# Patient Record
Sex: MALE | Race: WHITE | NOT HISPANIC OR LATINO | Employment: FULL TIME | URBAN - METROPOLITAN AREA
[De-identification: names, ages, dates, MRNs, and addresses within clinical notes are randomized per-mention and may not be internally consistent; named-entity substitution may affect disease eponyms.]

---

## 2018-09-27 ENCOUNTER — HOSPITAL ENCOUNTER (OUTPATIENT)
Dept: GENERAL RADIOLOGY | Facility: CLINIC | Age: 68
End: 2018-09-27
Attending: PODIATRIST
Payer: COMMERCIAL

## 2018-09-27 ENCOUNTER — HOSPITAL ENCOUNTER (OUTPATIENT)
Dept: MRI IMAGING | Facility: CLINIC | Age: 68
Discharge: HOME | End: 2018-09-27
Attending: PODIATRIST | Admitting: PODIATRIST
Payer: COMMERCIAL

## 2018-09-27 DIAGNOSIS — Z87.821 HISTORY OF FOREIGN BODY IN EYE: ICD-10-CM

## 2018-09-27 DIAGNOSIS — M25.579 ANKLE PAIN, UNSPECIFIED CHRONICITY, UNSPECIFIED LATERALITY: ICD-10-CM

## 2018-09-27 PROCEDURE — 73721 MRI JNT OF LWR EXTRE W/O DYE: CPT | Mod: RT

## 2018-09-27 PROCEDURE — 70030 X-RAY EYE FOR FOREIGN BODY: CPT

## 2018-11-28 ASSESSMENT — MIFFLIN-ST. JEOR: SCORE: 2062.77

## 2018-11-30 ASSESSMENT — MIFFLIN-ST. JEOR: SCORE: 2026.48

## 2018-12-05 ENCOUNTER — TRANSFERRED RECORDS (OUTPATIENT)
Dept: HEALTH INFORMATION MANAGEMENT | Facility: CLINIC | Age: 68
End: 2018-12-05

## 2018-12-05 ENCOUNTER — ANESTHESIA - HEALTHEAST (OUTPATIENT)
Dept: SURGERY | Facility: CLINIC | Age: 68
End: 2018-12-05

## 2018-12-05 ENCOUNTER — SURGERY - HEALTHEAST (OUTPATIENT)
Dept: SURGERY | Facility: CLINIC | Age: 68
End: 2018-12-05

## 2018-12-05 ASSESSMENT — MIFFLIN-ST. JEOR
SCORE: 2026.48
SCORE: 2027.39

## 2019-02-25 ENCOUNTER — TRANSFERRED RECORDS (OUTPATIENT)
Dept: HEALTH INFORMATION MANAGEMENT | Facility: CLINIC | Age: 69
End: 2019-02-25

## 2019-03-05 ENCOUNTER — HOSPITAL ENCOUNTER (OUTPATIENT)
Dept: WOUND CARE | Facility: CLINIC | Age: 69
Discharge: HOME | End: 2019-03-05
Attending: FAMILY MEDICINE | Admitting: FAMILY MEDICINE
Payer: COMMERCIAL

## 2019-03-05 VITALS — TEMPERATURE: 97.9 F

## 2019-03-05 PROCEDURE — G0463 HOSPITAL OUTPT CLINIC VISIT: HCPCS | Mod: 25

## 2019-03-05 PROCEDURE — 97602 WOUND(S) CARE NON-SELECTIVE: CPT

## 2019-03-05 PROCEDURE — 29581 APPL MULTLAYER CMPRN SYS LEG: CPT

## 2019-03-05 NOTE — PROGRESS NOTES
"Reason For Visit: Vu Rodriguez, 68 year old male, seen as outpatient to evaluate and treat right foot surgical wound. Referred by Dr. Lewis, [facility]. Patient presents with spouse  today.      History: Pt underwent surgery this ankle with Dr. Lewsi on 12/5/18 including right subtaler joint fusion, medial arch ligament and tendon repair, gastrocnemius repair.  He had hardwear in place including screws and plate.  Per pt wound is where the plate it located.  Pt states \"blister\" noted here at first post-op check and has since deteriorated.  Area was reportedly debrided by Dr. Lewis on 2/25, shows me photo of area prior to debridement and do note there was soft gray/tan eschar here.  Per spouse wound looked pretty red following debridement but is yellow again.  They have been applying saline moistened gauze packing 3 times daily and wrapping leg with ACE wrap foot to knee.      Pt with significant co-morbidities including HTN, hypercholesterolemia, and arterial disease of carotid arteries on which he has had endarterectomies, previous smoker quit in 2014.      Personal/social history: Lives with spouse who assists him with cares    Objective:   Physical appearance: adult male of average build  Ambulation: with cane and CAM boot though states he has been given permission to walk without CAM boot and does so in home.  Current treatment plan: saline moistened gauze  Last changed: this morning    Wound #1    Stage/tissue depth: full thickness  1.4 cm L x 1.8 cm W x 1.1 cm D  Tunneling: no  Undermining: no  Wound bed type/amount: 95% adherent yellow slough, 5% pale pink in portions near skin edge; non fluctuant  Wound Edges: attached  Periwound: some pink patchy irritation over [id number] area  Drainage: minimal  Odor: no  Pain: states he has some with deep probing but not able to elicit any in clinic today    Several other surgical incisions on foot that have healed.    Dorsalis Pedal Pulse: palpable: yes doppler: " biphasic  Hair growth: sparse but present on toes and lower leg  Capillary Refill: <3 seconds  Feet/toes color: pink, warm  R Leg: Edema 2+ in foot and 1+ pretibal to lower 2/3rd of lower leg.   L Leg: Edema pre-tibial 1+.   Pt states he always has some edema even prior to surgery, had been advised by vascular surgeon to wear compression stockings but does not wear as too hard to deal with    Has numbness of his foot since surgery.      Diet: eats regular meals, no concerns    Smoking: no    Discussed: compression options, moist wound healing and debridement options including autolytic and serial sharp debridement  Education: as above      Assessment:  Right foot surgical wound in inflammatory stage of wound healing with plate in bone below wound    Chronic LE edema, exacerbated by post-op state      Plan:    Topical care: Would begin Medihoney alginate for filler for debridement, barrier cream to periwound for protection, cover with Optifoam and co-flex 2 layer compression wrap for edema management which will be more effective than ACE wrap    Offloading: n/a    Additional recommendations: may require serial sharp debridement, next appointment with Dr. Lewis on 3/18/19 at 7:40 am    Wound Care: Wound cleansed with Microklenz and gauze, dried. Periwound protected with Critic aide paste. Wound base filled with medihoney alginate. Covered with Optifoam, followed by co-flex compression wrap.     Discussed plan of care with patient and spouse    The following discharge instructions were reviewed with and sent home with the patient:  Leave your Co-flex wrap in place until follow up appointment on Friday.    Don t get your Co-flex wrap wet. Cover the dressing completely with a plastic bag or plastic wrap before taking a shower. Tape the plastic to the skin above and below the dressing.  If you want to take a tub bath, leave the limb with the Co-flex wrap on the side of the tub and out of the water    If you experience  mild pain from the Co-flex wrap, elevate your leg above the level of your heart to reduce swelling and see if the pain resolves.  If the pain does not resolve, remove and resume prior cares.    Remove your Co-flex wrap ONLY IF you experience one or more of the following:  - Tingling or numbness in the injured body part  - Severe pain that cannot be relieved  - A fever of 100.4 F (38 C) or higher  - Swelling, coldness, or blue-gray color in the toes    Follow up Friday    Please call with any questions or concerns.      The following supplies were sent home with the patient:   None    Return visit: Friday    Verbal, written, & demonstrative education provided.  Face to face time (excluding procedure): approximately 60 minutes.  Procedure:   debridement dressing applied, co-flex wrap applied      Stacy Espinoza RN, CWOCN     [phone]

## 2019-03-05 NOTE — DISCHARGE INSTRUCTIONS
Leave your Co-flex wrap in place until follow up appointment on Friday.    Don t get your Co-flex wrap wet. Cover the dressing completely with a plastic bag or plastic wrap before taking a shower. Tape the plastic to the skin above and below the dressing.  If you want to take a tub bath, leave the limb with the Co-flex wrap on the side of the tub and out of the water    If you experience mild pain from the Co-flex wrap, elevate your leg above the level of your heart to reduce swelling and see if the pain resolves.  If the pain does not resolve, remove and resume prior cares.    Remove your Co-flex wrap ONLY IF you experience one or more of the following:  - Tingling or numbness in the injured body part  - Severe pain that cannot be relieved  - A fever of 100.4 F (38 C) or higher  - Swelling, coldness, or blue-gray color in the toes    Follow up Friday    Please call with any questions or concerns.    Stacy Espinoza RN, CWOCN   [phone]

## 2019-03-05 NOTE — IP AVS SNAPSHOT
MRN:[id number]                      After Visit Summary   3/5/2019    Vu Rodriguez    MRN: [id number]           Visit Information        Provider Department      3/5/2019 10:00 AM Constantino Khanna Nurse - [facility]           Review of your medicines      UNREVIEWED medicines. Ask your doctor about these medicines       Dose / Directions   aspirin 325 MG EC tablet  Commonly known as:  ASA      1 TABLET BY MOUTH DAILY  Refills:  0     folic acid 1 MG tablet  Commonly known as:  FOLVITE      1 TABLET DAILY  Refills:  0     PRINZIDE 20-12.5 MG tablet  Generic drug:  lisinopril-hydrochlorothiazide      1 TABLET DAILY  Refills:  0     ZOCOR 20 MG tablet  Generic drug:  simvastatin      1 TABLET EVERY EVENING  Refills:  0              Protect others around you: Learn how to safely use, store and throw away your medicines at www.disposemymeds.org.       Follow-ups after your visit       Your next 10 appointments already scheduled    Mar 08, 2019 10:00 AM CST  Office Visit with Et Nurse - [facility] ([facility]) [address] [address]  [phone]   Bring a current list of meds and any records pertaining to this visit. For Physicals, please bring immunization records and any forms needing to be filled out. Please arrive 10 minutes early to complete paperwork.   Mar 11, 2019  1:00 PM CDT  Office Visit with Et Nurse - [place]  [facility] ([facility]) [address] [address]  [phone]   Bring a current list of meds and any records pertaining to this visit. For Physicals, please bring immunization records and any forms needing to be filled out. Please arrive 10 minutes early to complete paperwork.   Mar 14, 2019 10:00 AM CDT  Office Visit with Et Nurse - [facility] ([facility]) [address] [address]  [phone]  "  Bring a current list of meds and any records pertaining to this visit. For Physicals, please bring immunization records and any forms needing to be filled out. Please arrive 10 minutes early to complete paperwork.      Care Instructions       Further instructions from your care team       Leave your Co-flex wrap in place until follow up appointment on Friday.    Don t get your Co-flex wrap wet. Cover the dressing completely with a plastic bag or plastic wrap before taking a shower. Tape the plastic to the skin above and below the dressing.  If you want to take a tub bath, leave the limb with the Co-flex wrap on the side of the tub and out of the water    If you experience mild pain from the Co-flex wrap, elevate your leg above the level of your heart to reduce swelling and see if the pain resolves.  If the pain does not resolve, remove and resume prior cares.    Remove your Co-flex wrap ONLY IF you experience one or more of the following:  - Tingling or numbness in the injured body part  - Severe pain that cannot be relieved  - A fever of 100.4 F (38 C) or higher  - Swelling, coldness, or blue-gray color in the toes    Follow up Friday    Please call with any questions or concerns.    Stacy Espinoza RN, [facility]   [phone]        Additional Information About Your Visit       [employer] Information    [employer] lets you send messages to your doctor, view your test results, renew your prescriptions, schedule appointments and more. To sign up, go to [email or web address] . Click on \"Log in\" on the left side of the screen, which will take you to the Welcome page. Then click on \"Sign up Now\" on the right side of the page.     You will be asked to enter the access code listed below, as well as some personal information. Please follow the directions to create your username and password.     Your access code is: [email or web address]  Expires: 2019 10:42 AM     Your access code will  in 90 days. If you need help or a " new code, please call your [facility] clinic or [phone].       Care EveryWhere ID    This is your Care EveryWhere ID. This could be used by other organizations to access your [facility] medical records  [id number]        Primary Care Provider Office Phone # Fax #    Steven Duane Semmler, [phone]      Equal Access to Services    [facility]: Hadii aad ku hadasho Soomaali, waaxda luqadaha, qaybta kaalmada adeegyada, waxay idiin hayaan adeeg belen lakatyan . So [facility] [phone].    ATENCIÓN: Si habla español, tiene a caruso disposición servicios gratuitos de asistencia lingüística. [facility] [phone].    We comply with applicable federal civil rights laws and [place] laws. We do not discriminate on the basis of race, color, national origin, age, disability, sex, sexual orientation, or gender identity.           Thank you!    Thank you for choosing [facility] for your care. Our goal is always to provide you with excellent care. Hearing back from our patients is one way we can continue to improve our services. Please take a few minutes to complete the written survey that you may receive in the mail after you visit with us. Thank you!            Medication List      ASK your doctor about these medications          Morning Afternoon Evening Bedtime As Needed    aspirin 325 MG EC tablet  Also known as:  ASA  INSTRUCTIONS:  1 TABLET BY MOUTH DAILY                     folic acid 1 MG tablet  Also known as:  FOLVITE  INSTRUCTIONS:  1 TABLET DAILY                     PRINZIDE 20-12.5 MG tablet  INSTRUCTIONS:  1 TABLET DAILY  Generic drug:  lisinopril-hydrochlorothiazide                     ZOCOR 20 MG tablet  INSTRUCTIONS:  1 TABLET EVERY EVENING  Generic drug:  simvastatin

## 2019-03-08 ENCOUNTER — HOSPITAL ENCOUNTER (OUTPATIENT)
Dept: WOUND CARE | Facility: CLINIC | Age: 69
Discharge: HOME | End: 2019-03-08
Attending: FAMILY MEDICINE | Admitting: FAMILY MEDICINE
Payer: COMMERCIAL

## 2019-03-08 PROCEDURE — 97602 WOUND(S) CARE NON-SELECTIVE: CPT

## 2019-03-08 PROCEDURE — 29580 STRAPPING UNNA BOOT: CPT

## 2019-03-08 NOTE — PROGRESS NOTES
"Reason For Visit/Interval History: Vu Rodriguez, 68 year old male, seen as outpatient for follow-up for right foot surgical wound. Co-flex 2 layer compression wrap was initiated on Tuesday with medihoney alginate for debridement.  Pt states this has been comfortable aside from wrap digging in and irritating up some at the back of his calf near knee, asks that the wrap be applied a bit lower, hits at about the same area that his CAM boot ends.  Also states was hot. Patient presents with spouse  today.     Pt was Referred by Dr. Lewis, [facility].      History: Pt underwent surgery this ankle with Dr. Lewis on 12/5/18 including right subtaler joint fusion, medial arch ligament and tendon repair, gastrocnemius repair.  He had hardwear in place including screws and plate.  Per pt wound is where the plate it located.  Pt states \"blister\" noted here at first post-op check and has since deteriorated.  Area was reportedly debrided by Dr. Lewis on 2/25, shows me photo of area prior to debridement and do note there was soft gray/tan eschar here.  Per spouse wound looked pretty red following debridement but is yellow again.  They have been applying saline moistened gauze packing 3 times daily and wrapping leg with ACE wrap foot to knee.      Pt with significant co-morbidities including HTN, hypercholesterolemia, and arterial disease of carotid arteries on which he has had endarterectomies, previous smoker quit in 2014.      Personal/social history: Lives with spouse who assists him with cares    Objective:   Physical appearance: adult male of average build  Ambulation: with cane and CAM boot though states he has been given permission to walk without CAM boot and does so in home.  Current treatment plan: Medihoney alginate, optifoam, barrier cream to periwound and Co-flex 2 layer compression wrap  Last changed: Tuesday.  On removal of wrap noted strike through drainage to foam layer of compression wrap    Wound " #1    Stage/tissue depth: full thickness  1.4 cm L x 1.8 cm W x 1.1 cm D (measured on Tuesday, not remeasured today)  Tunneling: no  Undermining: no  Wound bed type/amount: 90% adherent yellow slough, 10% granulation beginning near skin edge; non fluctuant   Wound Edges: attached  Periwound: Periwound is intact with no maceration, skin around wound peeling some to reveal intact epithelium beneath.  Pink spotted rash, (like heat rash) noted over dorsal foot and medial ankle where Optifoam did not reach, (is 100% clean under Optifoam,) some of anterior lower leg and larger patch along upper posterior calf where pt was feeling irritation.  This rash is not really new per spouse as they had noted for a while now, did note some on Tuesday as well be was very minimal, definitely worsened since Tuesday.  Drainage: moderate  Odor: no  Pain: states he has some with deep probing but not able to elicit any in clinic today    Several other surgical incisions on foot that have healed.    Dorsalis Pedal Pulse: palpable: yes doppler: biphasic  Hair growth: sparse but present on toes and lower leg  Capillary Refill: <3 seconds  Feet/toes color: pink, warm  R Leg: Edema trace to 1+ in foot and resolved pretibal    L Leg: Edema pre-tibial 1+.   Pt states he always has some edema even prior to surgery, had been advised by vascular surgeon to wear compression stockings but does not wear as too hard to deal with    Has numbness of his foot since surgery.      Diet: eats regular meals, no concerns    Smoking: no    Discussed: compression options, moist wound healing and debridement options including autolytic and serial sharp debridement  Education: as above      Assessment:  Right foot surgical wound in inflammatory stage of wound healing with plate in bone below wound, since Tuesday wound has autolytically debrided some and granulation tissue is now forming    Chronic LE edema, exacerbated by post-op state, resolving with compression  wrap    Skin rash worse in co-flex wrap      Plan:    Topical care: Continue Medihoney alginate for filler for debridement, barrier cream to periwound for protection, cover with Optifoam.  In place of co-flex 2 layer compression wrap will try unna boot with zinc/calamine due to itching and rash.  This was applied followed by cast padding around foot then 4 inch coflex at 50% overlap and 50% stretch.   After application pt states it felt better than the co-flex    Offloading: n/a    Additional recommendations: may require serial sharp debridement, next appointment with Dr. Lewis on 3/18/19 at 7:40 am    Wound Care: Wound cleansed with Microklenz and gauze, dried. Periwound protected with Critic aide paste. Wound base filled with medihoney alginate. Covered with Optifoam, followed by unna boot compression wrap as above. Eucerin Calming lotion applied to intact skin/rash.  He has a prescription cream and was asked to bring this with to next appointment.    Discussed plan of care with patient and spouse, advised may make a small cut in the top of wrap if it is digging in a bothering him but much try to encompass calf to be effective.    The following discharge instructions were previously reviewed with and sent home with the patient (advised pt that same instructions apply to unna boot wrap applied today):  Leave your Co-flex wrap in place until follow up appointment on Friday.    Don t get your Co-flex wrap wet. Cover the dressing completely with a plastic bag or plastic wrap before taking a shower. Tape the plastic to the skin above and below the dressing.  If you want to take a tub bath, leave the limb with the Co-flex wrap on the side of the tub and out of the water    If you experience mild pain from the Co-flex wrap, elevate your leg above the level of your heart to reduce swelling and see if the pain resolves.  If the pain does not resolve, remove and resume prior cares.    Remove your Co-flex wrap ONLY IF you  experience one or more of the following:  - Tingling or numbness in the injured body part  - Severe pain that cannot be relieved  - A fever of 100.4 F (38 C) or higher  - Swelling, coldness, or blue-gray color in the toes    Follow up Friday    Please call with any questions or concerns.      The following supplies were sent home with the patient:   None    Return visit: Monday    Verbal, written, & demonstrative education provided.  Face to face time (excluding procedure): approximately 60 minutes.  Procedure:   debridement dressing applied, unna boot applied      Stacy Espinoza RN, CWOCN     [phone]

## 2019-03-11 ENCOUNTER — HOSPITAL ENCOUNTER (OUTPATIENT)
Dept: WOUND CARE | Facility: CLINIC | Age: 69
Discharge: HOME | End: 2019-03-11
Attending: FAMILY MEDICINE | Admitting: FAMILY MEDICINE
Payer: COMMERCIAL

## 2019-03-11 PROCEDURE — 97602 WOUND(S) CARE NON-SELECTIVE: CPT

## 2019-03-11 PROCEDURE — 29580 STRAPPING UNNA BOOT: CPT | Mod: RT

## 2019-03-11 NOTE — PROGRESS NOTES
"Reason For Visit/Interval History: Vu Rodriguez, 68 year old male, seen as outpatient for follow-up for right foot surgical wound. Co-flex 2 layer compression wrap was held last Friday in favor of unna boot with calamine and zinc due to itching and rash.  Pt states itching is better but this wrap is \"just as hot\".  He brought with his triamcinolone ointment today for application to itching areas.  Have been using medihoney alginate for debridement.  Patient presents with spouse  today.     Pt was Referred by Dr. Lewis, [facility].      History: Pt underwent surgery this ankle with Dr. Lewis on 12/5/18 including right subtaler joint fusion, medial arch ligament and tendon repair, gastrocnemius repair.  He had hardwear in place including screws and plate.  Per pt wound is where the plate it located.  Pt states \"blister\" noted here at first post-op check and has since deteriorated.  Area was reportedly debrided by Dr. Lewis on 2/25, shows me photo of area prior to debridement and do note there was soft gray/tan eschar here.  Per spouse wound looked pretty red following debridement but is yellow again.  They have been applying saline moistened gauze packing 3 times daily and wrapping leg with ACE wrap foot to knee.      Pt with significant co-morbidities including HTN, hypercholesterolemia, and arterial disease of carotid arteries on which he has had endarterectomies, previous smoker quit in 2014.      Personal/social history: Lives with spouse who assists him with cares    Objective:   Physical appearance: adult male of average build  Ambulation: with cane and CAM boot though states he has been given permission to walk without CAM boot and does so in home.  Current treatment plan: Medihoney alginate, optifoam, barrier cream to periwound and zinc/calamine unna boot  Last changed: Friday.  On removal of wrap noted strike through drainage to foam layer of compression wrap with some thicker yellow drainage, " (likely liquifying necrotic tissue and melting honey,) seeping from wound with removal of foam    Wound #1    Stage/tissue depth: full thickness  1.4 cm L x 1.8 cm W x 1.6 cm D   Tunneling: no  Undermining: no  Wound bed type/amount: 70% adherent yellow slough, 30% granulation now noted in depth as well as near proximal most skin edge; non fluctuant   Wound Edges: attached  Periwound: Periwound is intact with no maceration, skin around wound peeling some to reveal intact epithelium beneath.  Pink spotted rash noted on Friday appears mostly resolved, (very faint noted on dorsal foot)  Drainage: moderate to heavy  Odor: no  Pain: states he has some with deep probing but not able to elicit any in clinic today    Several other surgical incisions on foot that have healed.    Dorsalis Pedal Pulse: palpable: yes doppler: biphasic  Hair growth: sparse but present on toes and lower leg  Capillary Refill: <3 seconds  Feet/toes color: pink, warm  R Leg: Edema resolved in lower leg but is 2+ over dorsal foot and trace ankle   L Leg: Edema pre-tibial 1+.   Pt states he always has some edema even prior to surgery, had been advised by vascular surgeon to wear compression stockings but does not wear as too hard to deal with    Has numbness of his foot since surgery.      Diet: eats regular meals, no concerns    Smoking: no    Discussed: compression options, moist wound healing and debridement options including autolytic and serial sharp debridement  Education: as above      Assessment:  Right foot surgical wound in inflammatory stage of wound healing with plate in bone below wound, since start of care last week wound has autolytically debrided some and granulation tissue is now forming, improved even from Friday    Chronic LE edema, exacerbated by post-op state, resolving with compression wrap though foot edema is persistent    Skin rash worse resolving in zinc/calamine unna boot      Plan:    Topical care: Continue Medihoney  alginate for filler for debridement, barrier cream to periwound for protection, cover with Optifoam.  Continue unna boot with zinc/calamine due to itching and rash. Applied triamcinolone to upper posterior calf where pt was itching. Unna boot was applied followed by then 4 inch coflex at 50% overlap and 50% stretch.       Offloading: n/a    Additional recommendations: may require serial sharp debridement, next appointment with Dr. Lewis on 3/18/19 at 7:40 am    Wound Care: Wound cleansed with Microklenz and gauze, dried. Periwound protected with Critic aide paste. Wound base filled with medihoney alginate. Covered with Optifoam, followed by unna boot compression wrap as above. Eucerin Calming lotion applied to intact skin/rash.      Discussed plan of care with patient and spouse    The following discharge instructions were previously reviewed with and sent home with the patient (advised pt that same instructions apply to unna boot wrap applied today):  Leave your Co-flex wrap in place until follow up appointment on Friday.    Don t get your Co-flex wrap wet. Cover the dressing completely with a plastic bag or plastic wrap before taking a shower. Tape the plastic to the skin above and below the dressing.  If you want to take a tub bath, leave the limb with the Co-flex wrap on the side of the tub and out of the water    If you experience mild pain from the Co-flex wrap, elevate your leg above the level of your heart to reduce swelling and see if the pain resolves.  If the pain does not resolve, remove and resume prior cares.    Remove your Co-flex wrap ONLY IF you experience one or more of the following:  - Tingling or numbness in the injured body part  - Severe pain that cannot be relieved  - A fever of 100.4 F (38 C) or higher  - Swelling, coldness, or blue-gray color in the toes    Follow up Friday    Please call with any questions or concerns.      The following supplies were sent home with the patient:    None    Return visit: Thursday    Verbal, written, & demonstrative education provided.  Face to face time (excluding procedure): approximately 30 minutes.  Procedure:   debridement dressing applied, unna boot applied      Stacy Espinoza RN, CWOCN     [phone]

## 2019-03-14 ENCOUNTER — OFFICE VISIT (OUTPATIENT)
Dept: SURGERY | Facility: CLINIC | Age: 69
End: 2019-03-14
Payer: COMMERCIAL

## 2019-03-14 ENCOUNTER — HOSPITAL ENCOUNTER (OUTPATIENT)
Dept: WOUND CARE | Facility: CLINIC | Age: 69
Discharge: HOME | End: 2019-03-14
Attending: FAMILY MEDICINE | Admitting: FAMILY MEDICINE
Payer: COMMERCIAL

## 2019-03-14 DIAGNOSIS — T14.8XXA LOCAL INFECTION OF WOUND: Primary | ICD-10-CM

## 2019-03-14 DIAGNOSIS — S91.301D OPEN WOUND OF RIGHT FOOT, SUBSEQUENT ENCOUNTER: Primary | ICD-10-CM

## 2019-03-14 DIAGNOSIS — L08.9 LOCAL INFECTION OF WOUND: Primary | ICD-10-CM

## 2019-03-14 PROCEDURE — 87186 SC STD MICRODIL/AGAR DIL: CPT | Performed by: SURGERY

## 2019-03-14 PROCEDURE — 99201 ZZC OFFICE/OUTPT VISIT, NEW, LEVEL I: CPT | Performed by: SURGERY

## 2019-03-14 PROCEDURE — 87070 CULTURE OTHR SPECIMN AEROBIC: CPT | Performed by: SURGERY

## 2019-03-14 PROCEDURE — 87077 CULTURE AEROBIC IDENTIFY: CPT | Performed by: SURGERY

## 2019-03-14 PROCEDURE — 97602 WOUND(S) CARE NON-SELECTIVE: CPT

## 2019-03-14 PROCEDURE — A6235 HYDROCOLLD DRG >16<=48 W/O B: HCPCS

## 2019-03-14 RX ORDER — SULFAMETHOXAZOLE/TRIMETHOPRIM 800-160 MG
1 TABLET ORAL 2 TIMES DAILY
Qty: 14 TABLET | Refills: 0 | Status: SHIPPED | OUTPATIENT
Start: 2019-03-14

## 2019-03-14 NOTE — PROGRESS NOTES
I was asked to see Mr. Rodriguez for potential wound infection with wound care.  I examined his leg at bedside. There is a small amount of erythema surrounding his chronic wound which appears to be well granulating.  He has good distal perfusion with brisk capillary refill and +2/4 DP pulses.  At this time, culture swab has been collected.  Bactrim DS for 7 days prescribed.  Patient to see his orthopedic surgeon as there is hardware.  He is scheduled early next week.  If he has worsening redness or fevers he is to come to ED for potential IV antibiotics.    Robert Zambrano, DO on 3/14/2019 at 11:34 AM

## 2019-03-14 NOTE — LETTER
3/14/2019         RE: Vu Rodriguez  [id number] [address] [id number]        Dear Colleague,    Thank you for referring your patient, Vu Rodriguez, to the [facility]. Please see a copy of my visit note below.    I was asked to see Mr. Rodriguez for potential wound infection with wound care.  I examined his leg at bedside. There is a small amount of erythema surrounding his chronic wound which appears to be well granulating.  He has good distal perfusion with brisk capillary refill and +2/4 DP pulses.  At this time, culture swab has been collected.  Bactrim DS for 7 days prescribed.  Patient to see his orthopedic surgeon as there is hardware.  He is scheduled early next week.  If he has worsening redness or fevers he is to come to ED for potential IV antibiotics.    Robert Zambrano DO on 3/14/2019 at 11:34 AM      Again, thank you for allowing me to participate in the care of your patient.        Sincerely,        Robert Zambrano DO

## 2019-03-14 NOTE — PROGRESS NOTES
"Reason For Visit/Interval History: Vu Rodriguez, 68 year old male, seen as outpatient for follow-up for right foot surgical wound. Unna boot last changed on Monday.  Pt denies concerns, no itching, no pain.  Have been using medihoney alginate for debridement.  Patient presents with spouse  today.     Pt was Referred by Dr. Lewis, [facility].      History: Pt underwent surgery this ankle with Dr. Lewis on 12/5/18 including right subtaler joint fusion, medial arch ligament and tendon repair, gastrocnemius repair.  He had hardwear in place including screws and plate.  Per pt wound is where the plate it located.  Pt states \"blister\" noted here at first post-op check and has since deteriorated.  Area was reportedly debrided by Dr. Lewis on 2/25, shows me photo of area prior to debridement and do note there was soft gray/tan eschar here.  Per spouse wound looked pretty red following debridement but is yellow again.  They have been applying saline moistened gauze packing 3 times daily and wrapping leg with ACE wrap foot to knee.      Pt with significant co-morbidities including HTN, hypercholesterolemia, and arterial disease of carotid arteries on which he has had endarterectomies, previous smoker quit in 2014.      Personal/social history: Lives with spouse who assists him with cares    Objective:   Physical appearance: adult male of average build  Ambulation: with cane and CAM boot though states he has been given permission to walk without CAM boot and does so in home.  Current treatment plan: Medihoney alginate, optifoam, barrier cream to periwound and zinc/calamine unna boot  Last changed: Friday.  On removal of wrap noted strike through drainage to foam bandage with some thicker yellow drainage, (likely liquifying necrotic tissue and melting honey,) seeping from wound with removal of foam.  There is some new faint erythema noted surrounding wound today, this was traced with skin marking pen, is not warm to " touch and pt is afebrile at 98.3    Wound #1        Stage/tissue depth: full thickness  1.4 cm L x 1.8 cm W x 1.6 cm D (not remeasured today)  Tunneling: no  Undermining: no  Wound bed type/amount: 75% adherent yellow slough, 25% granulation now noted in depth as well as near proximal most skin edge; non fluctuant (wound base is not as red today)  Wound Edges: attached  Periwound: mild erythema, traced, is up to about 1-3 cm surrounding wound (was not noted on Monday)  Drainage: heavy, serous drainage is pooling in wound following cares  Odor: no  Pain: states he has some with deep probing but not able to elicit any in clinic today    Several other surgical incisions on foot that have healed.    Dorsalis Pedal Pulse: palpable: yes doppler: biphasic  Hair growth: sparse but present on toes and lower leg  Capillary Refill: <3 seconds  Feet/toes color: pink, warm  R Leg: Edema resolved in lower leg but is trace to 1+  foot to toes  L Leg: Edema pre-tibial 1+.   Pt states he always has some edema even prior to surgery, had been advised by vascular surgeon to wear compression stockings but does not wear as too hard to deal with    Has numbness of his foot since surgery.      Diet: eats regular meals, no concerns    Smoking: no    Discussed: compression options, moist wound healing and debridement options including autolytic and serial sharp debridement  Education: as above      Assessment:  Right foot surgical wound in inflammatory stage of wound healing with plate in bone below wound, since start of care last week wound has autolytically debrided some and granulation tissue is now forming however new light erythema noted surrounding wound today    Chronic LE edema, exacerbated by post-op state, resolving with compression wrap though foot edema is persistent    Skin rash worse resolving in zinc/calamine unna boot      Plan:  Need to hold unna boot today to allow for daily monitoring of this are over weekend.    [facility] called due to concern for infection, he did see pt and Bactrim was ordered.  Swab culture collected from wound depth after irrigation with saline.    Topical care: Hold Medihoney alginate in favor of Aquacel Ag for absorption and antimicrobial as well as for filler and debridement, barrier cream to periwound for protection, cover with Mepilex Border 4x4 changed daily.  Spandagrip size F for compression, on during day and off at night.  Triamcinolone to intact skin PRN itching.       Offloading: n/a    Additional recommendations: may require serial sharp debridement, next appointment with Dr. Lewis on 3/18/19 at 7:40 am    Wound Care: Wound cleansed with Microklenz and gauze, dried. Periwound protected with Critic aide paste. Wound base filled with Aquacel Ag. Covered with Mepilex Border 4x4, followed by single layer size F Spandagrip    Discussed plan of care with patient and spouse.  Spouse is able/willing to do bandage change over next 3 days until scheduled follow-up on Monday    The following discharge instructions were reviewed with and sent home with the patient:  Wash your hands before and after the dressing change. You may wear gloves if you choose. Gloves are available over the counter at [facility], [facility], [facility], etc.   Use scissors at home that you can designate solely for wound care and cleanse with rubbing alcohol before and after use.    1.) Cleanse wound with saline and wipe with gauze. Clean surrounding skin with saline and dry with gauze  2.) Apply a strip of Aquacel Ag to wound base, ensuring to fill until level with skin (this will be a gelatinous consistency and likely a grayish color when removed from wound)  3.) Apply Criticaide moisture barrier paste to skin around the wound  4.) Cover with Mepilex Border 4x4   Change dressing daily and as needed for drainage, (foam bandage is more than 50% saturated with drainage as apparent though back side of the bandage).  If you run out of  Mepilex Foam then cover with gauze, (but continue using the Aquacel Ag in the wound)      Compression-   Apply Spandagrip size F tubular support single layer to leg, from toes to top of calf. Apply first thing in the morning and remove at night. Ensure no wrinkles. You may wear a regular sock over this. Check your skin for open or reddened areas after removing. If you experience pain while wearing your Spandagrip, elevate your leg. If elevating your leg for greater than 30 minutes does not relieve the pain, remove and discontinue wearing it. You may handwash this with soap and water, let air dry. You have been provided with an additional sleeve to switch out for hygienic purposes.    Follow up Monday OR ASAP in ER/Urgent Care is redness is worsening or you develop other infection symptoms as below.    Signs and symptoms of infection:  Green drainage  Foul odor  Increased pain to wound  Redness or swelling at the site of the wound  Fever    Follow-up with primary care provider as soon as possible if you notice any of these signs or symptoms.          The following supplies were sent home with the patient:   Aquacel Ag, saline vials for cleansing wound, Mepilex Border 4x4 (4), Critic aide barrier paste, and sterile cotton tipped applicators    Return visit: Monday    Verbal, written, & demonstrative education provided.  Face to face time (excluding procedure): approximately 30 minutes.  Procedure:  Debridement dressing applied      Stacy Espinoza RN, CWOCN     [phone]

## 2019-03-14 NOTE — DISCHARGE INSTRUCTIONS
Wash your hands before and after the dressing change. You may wear gloves if you choose. Gloves are available over the counter at [facility], [facility], [facility], etc.   Use scissors at home that you can designate solely for wound care and cleanse with rubbing alcohol before and after use.    1.) Cleanse wound with saline and wipe with gauze. Clean surrounding skin with saline and dry with gauze  2.) Apply a strip of Aquacel Ag to wound base, ensuring to fill until level with skin (this will be a gelatinous consistency and likely a grayish color when removed from wound)  3.) Apply Criticaide moisture barrier paste to skin around the wound  4.) Cover with Mepilex Border 4x4   Change dressing daily and as needed for drainage, (foam bandage is more than 50% saturated with drainage as apparent though back side of the bandage).  If you run out of Mepilex Foam then cover with gauze, (but continue using the Aquacel Ag in the wound)      Compression-   Apply Spandagrip size F tubular support single layer to leg, from toes to top of calf. Apply first thing in the morning and remove at night. Ensure no wrinkles. You may wear a regular sock over this. Check your skin for open or reddened areas after removing. If you experience pain while wearing your Spandagrip, elevate your leg. If elevating your leg for greater than 30 minutes does not relieve the pain, remove and discontinue wearing it. You may handwash this with soap and water, let air dry. You have been provided with an additional sleeve to switch out for hygienic purposes.    Follow up Monday OR ASAP in ER/Urgent Care is redness is worsening or you develop other infection symptoms as below.    Signs and symptoms of infection:  Green drainage  Foul odor  Increased pain to wound  Redness or swelling at the site of the wound  Fever    Follow-up with primary care provider as soon as possible if you notice any of these signs or symptoms.      Stacy Espinoza RN, CWOCN [phone]

## 2019-03-16 LAB
BACTERIA SPEC CULT: ABNORMAL
BACTERIA SPEC CULT: ABNORMAL
Lab: ABNORMAL
SPECIMEN SOURCE: ABNORMAL

## 2019-03-18 ENCOUNTER — HOSPITAL ENCOUNTER (OUTPATIENT)
Dept: WOUND CARE | Facility: CLINIC | Age: 69
Discharge: HOME | End: 2019-03-18
Attending: SURGERY | Admitting: SURGERY
Payer: COMMERCIAL

## 2019-03-18 PROCEDURE — 97602 WOUND(S) CARE NON-SELECTIVE: CPT

## 2019-03-18 PROCEDURE — 29580 STRAPPING UNNA BOOT: CPT | Mod: 50

## 2019-03-18 PROCEDURE — A6235 HYDROCOLLD DRG >16<=48 W/O B: HCPCS

## 2019-03-18 NOTE — PROGRESS NOTES
"Reason For Visit/Interval History: Vu Rodriguez, 68 year old male, seen as outpatient for follow-up for right foot surgical wound. Pt last seen on Thursday, at that time compression wrap was held due to concerns for increasing erythema around wound, wound was cultured and pt put on Bactrim DS for 7 days by Dr. Zambrano.  They have been doing daily wound cares since then and using size F spandagrip for compression.  Pt presents with spouse  today. They have just come from a follow-up appointment with Dr. Lewis and state that over the weekend they started to see what looked like metal in the wound base and this was confirmed at appointment with Dr. Lewis today so plan if for him to go the OR for debridement but they states Dr. Lewis does not want to remove the hardware.  This has been scheduled for April 3rd.    Pt was Referred by Dr. Lewis, [facility].      History: Pt underwent surgery this ankle with Dr. Lewis on 12/5/18 including right subtaler joint fusion, medial arch ligament and tendon repair, gastrocnemius repair.  He had hardwear in place including screws and plate.  Per pt wound is where the plate it located.  Pt states \"blister\" noted here at first post-op check and has since deteriorated.  Area was reportedly debrided by Dr. Lewis on 2/25, shows me photo of area prior to debridement and do note there was soft gray/tan eschar here.  Per spouse wound looked pretty red following debridement but is yellow again.  They have been applying saline moistened gauze packing 3 times daily and wrapping leg with ACE wrap foot to knee.      Pt with significant co-morbidities including HTN, hypercholesterolemia, and arterial disease of carotid arteries on which he has had endarterectomies, previous smoker quit in 2014.      Personal/social history: Lives with spouse who assists him with cares    Objective:   Physical appearance: adult male of average build  Ambulation: with cane and CAM boot though states he " has been given permission to walk without CAM boot and does so in home.  Current treatment plan: Aquacel Ag filler, Mepilex border 4x4, barrier cream to periwound and size F spandagrip for compression changed daily  Last changed: this morning in Dr. Lewis's office, pt comes with gauze in place with bloody drainage from debridement in clinic.  Spouse states over weekend there was strike though drainage to Mepilex Border each day though was not completely saturated, indicates with her fingers about quarter sized.    Wound #1        Stage/tissue depth: full thickness  1.3 cm L x 1.2 cm W x 1.7 cm D)  Tunneling: no  Undermining: no  Wound bed type/amount: 70% adherent yellow slough, 30% granulation, metal noted in base; non fluctuant   Wound Edges: attached  Periwound: intact around wound with no signs of infection, pt has some mild pink rash around back of ankle/achilles area and over healed surgical scars medial foot, did not itch until rubbed   Drainage: moderate to heavy  Odor: no  Pain: states he has some with deep probing but not able to elicit any in clinic today    3/14/19 Culture:  (Abnormal) 03/14/2019 11:00    Abnormal   Moderate growth   Klebsiella (Enterobacter) aerogenes     Culture Micro (Abnormal) 03/14/2019 11:00    Abnormal   Moderate growth   Enterococcus faecalis       Several other surgical incisions on foot that have healed.    Dorsalis Pedal Pulse: palpable: yes doppler: biphasic  Hair growth: sparse but present on toes and lower leg  Capillary Refill: <3 seconds  Feet/toes color: pink, warm  R Leg: Edema resolved in lower leg but is trace to 1+  foot to toes  L Leg: Edema pre-tibial 1+.   Pt states he always has some edema even prior to surgery, had been advised by vascular surgeon to wear compression stockings but does not wear as too hard to deal with    Has numbness of his foot since surgery.      Diet: eats regular meals, no concerns    Smoking: no    Discussed: compression  options, moist wound healing and debridement options including autolytic and serial sharp debridement  Education: as above      Assessment:  Right foot surgical wound in inflammatory stage of wound healing with plate in bone below wound, plate has become visible with debridement, wound continues to autolytically debride some and granulation tissue is now forming    Infection symptoms are resolved on Bactrim though culture reports 2 bacteria present, one is sensitive to Bactrim    Chronic LE edema, exacerbated by post-op state, resolving with compression wrap though foot edema is persistent    Skin rash worse in co-flex compression wrap resolving in zinc/calamine unna boot      Plan:  Discussed care options with pt and spouse including continuing with spandagrip for compression and spouse changing bandages versus resumption of unna boot changed twice weekly.  They did elect to resume unna boot.  Goal is to continue to support debridement and granulation in hopes that area will heal/improve and minimize work that needs to be done surgically.      Topical care: Continue Aquacel Ag for absorption and antimicrobial as well as for filler and debridement, barrier cream to periwound for protection, cover with Optifoam followed by zinc unna boot and 4 inch coban 50% over lap and 50% stretch. Triamcinolone to rash, (pt brings his).       Offloading: n/a    Additional recommendations: may require serial sharp debridement, this is planned in OR on 4/3/19    Discussed plan of care with patient and spouse who are in agreement      Return visit: Thursday    Verbal, written, & demonstrative education provided.  Face to face time (excluding procedure): approximately 30 minutes.  Procedure:  Unna boot    Stacy Espinoza RN, CWOCN     [phone]

## 2019-03-21 ENCOUNTER — HOSPITAL ENCOUNTER (OUTPATIENT)
Dept: WOUND CARE | Facility: CLINIC | Age: 69
Discharge: HOME | End: 2019-03-21
Attending: SURGERY | Admitting: SURGERY
Payer: COMMERCIAL

## 2019-03-21 PROCEDURE — 97602 WOUND(S) CARE NON-SELECTIVE: CPT

## 2019-03-21 PROCEDURE — 29580 STRAPPING UNNA BOOT: CPT

## 2019-03-21 PROCEDURE — G0463 HOSPITAL OUTPT CLINIC VISIT: HCPCS | Mod: 25

## 2019-03-21 NOTE — PROGRESS NOTES
"Reason For Visit/Interval History: Vu Rodriguez, 68 year old male, seen as outpatient for follow-up for right foot surgical wound.     Pt presents with spouse  Today. They saw Dr. Lewis on Monday 3/18/19 and he confirmed there is metal showing in the wound base so plan is for him to go the OR for debridement but they states Dr. Lewis does not want to remove the hardware.  OR has been scheduled for April 3rd.    Pt was Referred by Dr. Lewis, [facility].      History: Pt underwent surgery this ankle with Dr. Lewis on 12/5/18 including right subtaler joint fusion, medial arch ligament and tendon repair, gastrocnemius repair.  He had hardwear in place including screws and plate.  Per pt wound is where the plate it located.  Pt states \"blister\" noted here at first post-op check and has since deteriorated.  Area was reportedly debrided by Dr. Lewis on 2/25, shows me photo of area prior to debridement and do note there was soft gray/tan eschar here.  Per spouse wound looked pretty red following debridement but is yellow again.  They have been applying saline moistened gauze packing 3 times daily and wrapping leg with ACE wrap foot to knee.      Pt with significant co-morbidities including HTN, hypercholesterolemia, and arterial disease of carotid arteries on which he has had endarterectomies, previous smoker quit in 2014.      Personal/social history: Lives with spouse who assists him with cares    Objective:   Physical appearance: adult male of average build  Ambulation: with cane and CAM boot though states he has been given permission to walk without CAM boot and does so in home.  Current treatment plan: Aquacel Ag filler, Optifoam for absorption and then Unna boot for compression.  To be changed 2 times per week.    Last changed:  Monday     Wound #1      3/21/19 - No photo    Stage/tissue depth: full thickness - Did not measure today)  1.3 cm L x 1.2 cm W x 1.7 cm D)  Tunneling: no  Undermining: no  Wound bed " type/amount: 60% granulation/40% yellow slough metal noted in base; non fluctuant   Wound Edges: attached  Periwound: intact around wound with no signs of infection.  Drainage: small amount of drainage on Optifoam  Odor: no  Pain: states he has some with deep probing but not able to elicit any in clinic today    3/14/19 Culture:  (Abnormal) 03/14/2019 11:00    Abnormal   Moderate growth   Klebsiella (Enterobacter) aerogenes     Culture Micro (Abnormal) 03/14/2019 11:00    Abnormal   Moderate growth   Enterococcus faecalis       Several other surgical incisions on foot that have healed.    Dorsalis Pedal Pulse: palpable: yes doppler: biphasic  Hair growth: sparse but present on toes and lower leg  Capillary Refill: <3 seconds  Feet/toes color: pink, warm  R Leg: Edema resolved in lower leg but is trace to 1+  foot to toes  L Leg: Edema pre-tibial 1+.   Pt states he always has some edema even prior to surgery, had been advised by vascular surgeon to wear compression stockings but does not wear as too hard to deal with    Has numbness of his foot since surgery.      Diet: eats regular meals, no concerns    Smoking: no    Discussed: compression options, moist wound healing and debridement options including autolytic and serial sharp debridement  Education: as above      Assessment:  Right foot surgical wound in inflammatory stage of wound healing with plate in bone below wound, plate has become visible with debridement, wound continues to autolytically debride with more granulation tissue forming    Infection symptoms are resolved on Bactrim though culture reports 2 bacteria present, one is sensitive to Bactrim    Chronic LE edema, exacerbated by post-op state, resolving with compression wrap though foot edema is persistent    Skin rash worse in co-flex compression wrap resolving in zinc  unna boot      Plan:  Continue with unna boot for compression and aquacel ag to fill wound cavity.      Topical care:  Continue Aquacel Ag for absorption and antimicrobial as well as for filler and debridement, barrier cream to periwound for protection, cover with Optifoam followed by zinc unna boot and 4 inch coban 50% over lap and 50% stretch. Triamcinolone to rash, (pt brings his).       Offloading: n/a    Additional recommendations: may require serial sharp debridement, this is planned in OR on 4/3/19    Discussed plan of care with patient and spouse who are in agreement      Return visit: Monday and Friday next week.    Verbal, written, & demonstrative education provided.  Face to face time (excluding procedure): approximately 30 minutes.  Procedure:  Sawyer Angel RN [facility]  [phone]

## 2019-03-25 ENCOUNTER — HOSPITAL ENCOUNTER (OUTPATIENT)
Dept: WOUND CARE | Facility: CLINIC | Age: 69
Discharge: HOME | End: 2019-03-25
Attending: FAMILY MEDICINE | Admitting: FAMILY MEDICINE
Payer: COMMERCIAL

## 2019-03-25 PROCEDURE — 29580 STRAPPING UNNA BOOT: CPT

## 2019-03-25 PROCEDURE — 97602 WOUND(S) CARE NON-SELECTIVE: CPT

## 2019-03-25 PROCEDURE — G0463 HOSPITAL OUTPT CLINIC VISIT: HCPCS | Mod: 25

## 2019-03-25 NOTE — PROGRESS NOTES
"Reason For Visit/Interval History: Vu Rodriguez, 68 year old male, seen as outpatient for follow-up for right foot surgical wound.     Pt presents with spouse  Today. They saw Dr. Lewis on Monday 3/18/19 and he confirmed there is metal showing in the wound base so plan is for him to go the OR for debridement but they states Dr. Lewis does not want to remove the hardware.  OR has been scheduled for April 3rd.    Wife asking why pt has not been in a VAC.  [place]  Pt was told he was not appropriate for a VAC initially when seen by WOC RN due to the necrotic wound.  Now his MD has decided to take back to the OR due to exposed hardware.  Pt was told he would be VAC appropriate at that time but it was up to his ordering MD.  Did explain to patient how the VAC worked.      Wife asking about the billing of this procedure.  She was provided the number given by Lala Marlow, Charge Capture specialist [phone].    Pt was Referred by Dr. Lewis, [facility].      History: Pt underwent surgery this ankle with Dr. Lewis on 12/5/18 including right subtaler joint fusion, medial arch ligament and tendon repair, gastrocnemius repair.  He had hardwear in place including screws and plate.  Per pt wound is where the plate it located.  Pt states \"blister\" noted here at first post-op check and has since deteriorated.  Area was reportedly debrided by Dr. Lewis on 2/25, shows me photo of area prior to debridement and do note there was soft gray/tan eschar here.  Per spouse wound looked pretty red following debridement but is yellow again.  They have been applying saline moistened gauze packing 3 times daily and wrapping leg with ACE wrap foot to knee.      Pt with significant co-morbidities including HTN, hypercholesterolemia, and arterial disease of carotid arteries on which he has had endarterectomies, previous smoker quit in 2014.      Personal/social history: Lives with spouse who assists " him with cares    Objective:   Physical appearance: adult male of average build  Ambulation: with cane and CAM boot though states he has been given permission to walk without CAM boot and does so in home.  Current treatment plan: Aquacel Ag filler, Optifoam for absorption and then Unna boot for compression.  To be changed 2 times per week.    Last changed:  Monday     Wound #1      3/21/19 - No photo    Stage/tissue depth: full thickness  1.3 cm L x 1.2 cm W x 1.7 cm D)  Tunneling: no  Undermining: no  Wound bed type/amount: 80% granulation/20% yellow slough metal noted in base; non fluctuant   Wound Edges: attached  Periwound: intact around wound with no signs of infection.  Drainage: small amount of drainage on Optifoam  Odor: no  Pain: states he has some with deep probing but not able to elicit any in clinic today    3/14/19 Culture:  (Abnormal) 03/14/2019 11:00    Abnormal   Moderate growth   Klebsiella (Enterobacter) aerogenes     Culture Micro (Abnormal) 03/14/2019 11:00    Abnormal   Moderate growth   Enterococcus faecalis       Several other surgical incisions on foot that have healed.    Dorsalis Pedal Pulse: palpable: yes doppler: biphasic  Hair growth: sparse but present on toes and lower leg  Capillary Refill: <3 seconds  Feet/toes color: pink, warm  R Leg: Edema resolved in lower leg but is trace to 1+  foot to toes  L Leg: Edema pre-tibial 1+.   Pt states he always has some edema even prior to surgery, had been advised by vascular surgeon to wear compression stockings but does not wear as too hard to deal with    Has numbness of his foot since surgery.      Diet: eats regular meals, no concerns    Smoking: no    Discussed: compression options, moist wound healing and debridement options including autolytic and serial sharp debridement  Education: as above      Assessment:  Right foot surgical wound in inflammatory stage of wound healing with plate in bone below wound, plate has become  visible with debridement, wound continues to autolytically debride with more granulation tissue forming    Infection symptoms are resolved.     Chronic LE edema, exacerbated by post-op state, resolving with compression wrap though foot edema is persistent    Skin rash worse in co-flex compression wrap resolving in zinc  unna boot      Plan:  Continue with unna boot for compression and aquacel ag to fill wound cavity with 2x weekly changes until pt goes to OR with Dr. Pearson 4/3/19    Topical care: Continue Aquacel Ag for absorption and antimicrobial as well as for filler and debridement, barrier cream to periwound for protection, cover with Optifoam followed by zinc unna boot and 4 inch coban 50% over lap and 50% stretch. Triamcinolone to rash, pt did not bring it along but no need today-No rash       Offloading: n/a    Additional recommendations: may require serial sharp debridement, this is planned in OR on 4/3/19    Pt provided number to call for billing questions.      Discussed plan of care with patient and spouse who are in agreement      Return visit: Friday 3/25 and Monday 4/1/19    Verbal, written, & demonstrative education provided.  Face to face time (excluding procedure): approximately 30 minutes.  Procedure:  Unna boot    Becca Angel RN [facility]  [phone]

## 2019-03-29 ENCOUNTER — HOSPITAL ENCOUNTER (OUTPATIENT)
Dept: WOUND CARE | Facility: CLINIC | Age: 69
Discharge: HOME | End: 2019-03-29
Attending: SURGERY | Admitting: SURGERY
Payer: COMMERCIAL

## 2019-03-29 PROCEDURE — 97602 WOUND(S) CARE NON-SELECTIVE: CPT

## 2019-03-29 PROCEDURE — 29580 STRAPPING UNNA BOOT: CPT

## 2019-03-29 PROCEDURE — G0463 HOSPITAL OUTPT CLINIC VISIT: HCPCS | Mod: 25

## 2019-03-29 NOTE — PROGRESS NOTES
"Reason For Visit/Interval History: Vu Rodriguez, 68 year old male, seen as outpatient for follow-up for right foot surgical wound.     Pt presents with spouse  Today. They saw Dr. Lewis on Monday 3/18/19 and he confirmed there is metal showing in the wound base so plan is for him to go the OR for debridement but they states Dr. Lewis does not want to remove the hardware.  OR has been scheduled for April 3rd.    Wife asking about the billing of this procedure.  They are getting charged $800 for each unna boot change.  She was provided the number given by Lala Marlow, Charge Capture specialist [phone].  She called that number and states the [facility] person was \"rude\" to her.  This WOC placed a call to patient relations and they came down and talked to the patient and his wife.      Pt and wife asking if is necessary to have the boot changed again on Monday, April 1st.  That is when they will have a new insurance plan and not sure if that will cover the visits either.  All agreed that the boot could stay in place from Friday until Wednesday when pt is scheduled for surgery with Dr. Pearson.    Pt was Referred by Dr. Lewis, [facility].      History: Pt underwent surgery this ankle with Dr. Lewis on 12/5/18 including right subtaler joint fusion, medial arch ligament and tendon repair, gastrocnemius repair.  He had hardwear in place including screws and plate.  Per pt wound is where the plate it located.  Pt states \"blister\" noted here at first post-op check and has since deteriorated.  Area was reportedly debrided by Dr. Lewis on 2/25, shows me photo of area prior to debridement and do note there was soft gray/tan eschar here.  Per spouse wound looked pretty red following debridement but is yellow again.  They have been applying saline moistened gauze packing 3 times daily and wrapping leg with ACE wrap foot to knee.      Pt with significant co-morbidities including HTN, hypercholesterolemia, and arterial " disease of carotid arteries on which he has had endarterectomies, previous smoker quit in 2014.      Personal/social history: Lives with spouse who assists him with cares    Objective:   Physical appearance: adult male of average build  Ambulation: with cane and CAM boot though states he has been given permission to walk without CAM boot and does so in home.  Current treatment plan: Aquacel Ag filler, Optifoam for absorption and then Unna boot for compression.  To be changed 2 times per week.    Last changed:  Monday     Wound #1      3/21/19 - No photo    Stage/tissue depth: full thickness  1.3 cm L x 1.2 cm W x 1.7 cm D)  Tunneling: no  Undermining: no  Wound bed type/amount: 80% granulation/20% yellow slough metal noted in base; non fluctuant   Wound Edges: attached  Periwound: intact around wound with no signs of infection.  Drainage: small amount of drainage on Optifoam  Odor: no  Pain: no complaints     3/14/19 Culture:  (Abnormal) 03/14/2019 11:00    Abnormal   Moderate growth   Klebsiella (Enterobacter) aerogenes     Culture Micro (Abnormal) 03/14/2019 11:00    Abnormal   Moderate growth   Enterococcus faecalis       Several other surgical incisions on foot that have healed.    Dorsalis Pedal Pulse: palpable: yes doppler: biphasic  Hair growth: sparse but present on toes and lower leg  Capillary Refill: <3 seconds  Feet/toes color: pink, warm  R Leg: Edema resolved in lower leg but is trace to 1+  foot to toes  L Leg: Edema pre-tibial 1+.   Pt states he always has some edema even prior to surgery, had been advised by vascular surgeon to wear compression stockings but does not wear as too hard to deal with    Has numbness of his foot since surgery.      Diet: eats regular meals, no concerns    Smoking: no    Discussed:  Leaving the unna boot in place until surgery on Wednesday next week.  If pt is concerned and needs to remove the wrap his wife will redress with aquacel, foam, kerlex and coban.         Assessment:  Right foot surgical wound in inflammatory stage of wound healing with plate in bone below wound, plate has become visible with debridement, wound continues to autolytically debride with more granulation tissue forming    Infection symptoms are resolved.     Chronic LE edema, exacerbated by post-op state, resolving with compression wrap though foot edema is persistent    Skin rash worse in co-flex compression wrap resolving in zinc  unna boot      Plan:  Continue with unna boot for compression and aquacel ag to fill wound cavity  until pt goes to OR with Dr. Pearson 4/3/19    Topical care: Continue Aquacel Ag for absorption and antimicrobial as well as for filler and debridement, barrier cream to periwound for protection, cover with Optifoam followed by zinc unna boot and 4 inch coban 50% over lap and 50% stretch.     Offloading: n/a    Pt/Wife talked with Patient Relations about billing issues.      Discussed plan of care with patient and spouse who are in agreement      Return visit: No further WOC visits until after surgery and new orders placed by Dr. Lewis.    Verbal, written, & demonstrative education provided.  Face to face time (excluding procedure): approximately 30 minutes.  Procedure:  Unna boot    Becca Angel RN [facility]  [phone]

## 2019-04-01 ENCOUNTER — RECORDS - HEALTHEAST (OUTPATIENT)
Dept: ADMINISTRATIVE | Facility: OTHER | Age: 69
End: 2019-04-01

## 2019-04-03 ENCOUNTER — SURGERY - HEALTHEAST (OUTPATIENT)
Dept: SURGERY | Facility: CLINIC | Age: 69
End: 2019-04-03

## 2019-04-03 ENCOUNTER — ANESTHESIA - HEALTHEAST (OUTPATIENT)
Dept: SURGERY | Facility: CLINIC | Age: 69
End: 2019-04-03

## 2021-05-27 NOTE — ANESTHESIA POSTPROCEDURE EVALUATION
Patient: Vu Rodriguez  RIGHT FOOT MEDIAL WOUND SURGICAL IRRIGATION AND DEBRIDEMENT WITH CLOSURE, HARDWARE REMOVAL  Anesthesia type: MAC    Patient location: Phase II Recovery  Last vitals:   Vitals:    04/03/19 1404   BP: 120/57   Pulse: 78   Resp: 16   Temp: 36.7  C (98  F)   SpO2: 97%     Post vital signs: stable  Level of consciousness: awake and return to baseline  Post-anesthesia pain: pain controlled  Post-anesthesia nausea and vomiting: no  Pulmonary: unassisted, return to baseline  Cardiovascular: stable and blood pressure at baseline  Hydration: adequate  Anesthetic events: no    QCDR Measures:  ASA# 11 - Elva-op Cardiac Arrest: ASA11B - Patient did NOT experience unanticipated cardiac arrest  ASA# 12 - Elva-op Mortality Rate: ASA12B - Patient did NOT die  ASA# 13 - PACU Re-Intubation Rate: NA - No ETT / LMA used for case  ASA# 10 - Composite Anes Safety: ASA10A - No serious adverse event    Additional Notes:

## 2021-05-27 NOTE — ANESTHESIA CARE TRANSFER NOTE
Last vitals:   Vitals:    04/03/19 1120   BP: 150/69   Pulse: 69   Resp: 20   Temp: 37.1  C (98.7  F)   SpO2: 94%     Patient's level of consciousness is awake  Spontaneous respirations: yes  Maintains airway independently: yes  Dentition unchanged: yes  Oropharynx: oropharynx clear of all foreign objects    QCDR Measures:  ASA# 20 - Surgical Safety Checklist: WHO surgical safety checklist completed prior to induction    PQRS# 430 - Adult PONV Prevention: NA - Not adult patient, not GA or 3 or more risk factors NOT present  ASA# 8 - Peds PONV Prevention: NA - Not pediatric patient, not GA or 2 or more risk factors NOT present  PQRS# 424 - Elva-op Temp Management: 4559F - At least one body temp DOCUMENTED => 35.5C or 95.9F within required timeframe  PQRS# 426 - PACU Transfer Protocol: - Transfer of care checklist used  ASA# 14 - Acute Post-op Pain: ASA14B - Patient did NOT experience pain >= 7 out of 10

## 2021-05-27 NOTE — ANESTHESIA PREPROCEDURE EVALUATION
Anesthesia Evaluation      Patient summary reviewed   No history of anesthetic complications     Airway   Mallampati: II  Neck ROM: full   Pulmonary - negative ROS and normal exam    breath sounds clear to auscultation                         Cardiovascular - normal exam  (+) hypertension, CAD, , PVD    ECG reviewed  Rhythm: regular  Rate: normal,         Neuro/Psych - negative ROS     Endo/Other    (+) arthritis, obesity,      GI/Hepatic/Renal - negative ROS           Dental - normal exam   (+) poor dentition                       Anesthesia Plan  Planned anesthetic: MAC    ASA 3     Anesthetic plan and risks discussed with: patient    Post-op plan: routine recovery

## 2021-06-02 VITALS — BODY MASS INDEX: 40.24 KG/M2 | WEIGHT: 270.5 LBS

## 2021-06-02 VITALS — WEIGHT: 280 LBS | BODY MASS INDEX: 41.47 KG/M2 | HEIGHT: 69 IN

## 2021-06-16 PROBLEM — I25.10 CAD (CORONARY ARTERY DISEASE): Status: ACTIVE | Noted: 2018-12-06

## 2021-06-16 PROBLEM — Z78.9 ALCOHOL USE: Status: ACTIVE | Noted: 2018-12-06

## 2021-06-16 PROBLEM — I10 HTN (HYPERTENSION): Status: ACTIVE | Noted: 2018-12-06

## 2021-06-16 PROBLEM — G89.18 POST-OP PAIN: Status: ACTIVE | Noted: 2018-12-05

## 2021-06-16 PROBLEM — E78.2 MIXED HYPERLIPIDEMIA: Status: ACTIVE | Noted: 2018-12-06

## 2021-06-22 NOTE — ANESTHESIA PROCEDURE NOTES
Peripheral Block    Patient location during procedure: pre-op  Start time: 12/5/2018 12:46 PM  End time: 12/5/2018 12:50 PM  post-op analgesia per surgeon order as noted in medical record  Staffing:  Performing  Anesthesiologist: Thomas Boswer MD  Preanesthetic Checklist  Completed: patient identified, site marked, risks, benefits, and alternatives discussed, timeout performed, consent obtained, at patient's request, airway assessed, oxygen available, suction available, emergency drugs available and hand hygiene performed  Peripheral Block  Block type: saphenous  Prep: ChloraPrep  Patient position: supine  Patient monitoring: cardiac monitor, continuous pulse oximetry, blood pressure and heart rate  Laterality: right  Injection technique: ultrasound guided    Ultrasound used to visualize needle placement in proximity to nerve being blocked: yes   Permanent ultrasound image captured for medical record  Sterile gel and probe cover used for ultrasound.    Needle  Needle type: Stimuplex   Needle gauge: 20G  Needle length: 6 in  no peripheral nerve catheter placed  Assessment  Injection assessment: negative aspiration for heme, no difficulty with injection, no paresthesia on injection and incremental injection

## 2021-06-22 NOTE — ANESTHESIA PROCEDURE NOTES
Peripheral Block    Patient location during procedure: pre-op  Start time: 12/5/2018 12:36 PM  End time: 12/5/2018 12:46 PM  post-op analgesia per surgeon order as noted in medical record  Staffing:  Performing  Anesthesiologist: Thomas Bowser MD  Preanesthetic Checklist  Completed: patient identified, site marked, risks, benefits, and alternatives discussed, timeout performed, consent obtained, at patient's request, airway assessed, oxygen available, suction available, emergency drugs available and hand hygiene performed  Peripheral Block  Block type: sciatic  Prep: ChloraPrep  Patient position: supine  Patient monitoring: cardiac monitor, continuous pulse oximetry, blood pressure and heart rate  Laterality: right  Injection technique: ultrasound guided    Ultrasound used to visualize needle placement in proximity to nerve being blocked: yes   Permanent ultrasound image captured for medical record  Sterile gel and probe cover used for ultrasound.    Needle  Needle type: Stimuplex   Needle gauge: 20G  Needle length: 6 in  no peripheral nerve catheter placed  Assessment  Injection assessment: negative aspiration for heme, no difficulty with injection, no paresthesia on injection and incremental injection  Additional Notes  Block done a bifurcation of the sciatic nerve.

## 2021-06-22 NOTE — ANESTHESIA CARE TRANSFER NOTE
Last vitals:   Vitals:    12/05/18 1632   BP: 148/84   Pulse: 100   Resp: 12   Temp: 37.2  C (98.9  F)   SpO2: 96%     Patient's level of consciousness is drowsy  Spontaneous respirations: yes  Maintains airway independently: yes  Dentition unchanged: yes  Oropharynx: oropharynx clear of all foreign objects    QCDR Measures:  ASA# 20 - Surgical Safety Checklist: WHO surgical safety checklist completed prior to induction    PQRS# 430 - Adult PONV Prevention: 4558F - Pt received => 2 anti-emetic agents (different classes) preop & intraop  ASA# 8 - Peds PONV Prevention: 4558F - Pt received => 2 anti-emetic agents (different classes) preop & intraop  PQRS# 424 - Elva-op Temp Management: 4559F - At least one body temp DOCUMENTED => 35.5C or 95.9F within required timeframe  PQRS# 426 - PACU Transfer Protocol: - Transfer of care checklist used  ASA# 14 - Acute Post-op Pain: ASA14B - Patient did NOT experience pain >= 7 out of 10

## 2021-06-22 NOTE — ANESTHESIA POSTPROCEDURE EVALUATION
Patient: Vu Rodriguez  RIGHT SUBTALAR JOINT FUSION;, MEDIAL ARCH LIGAMENT AND TENDON REPAIR;, GASTROCNEMIUS RECESSION  Anesthesia type: general    Patient location: PACU  Last vitals:   Vitals:    12/05/18 1700   BP: 130/67   Pulse: 82   Resp: 10   Temp: 36.7  C (98  F)   SpO2: 92%     Post vital signs: stable  Level of consciousness: awake and responds to simple questions  Post-anesthesia pain: pain controlled  Post-anesthesia nausea and vomiting: no  Pulmonary: nasal cannula  Cardiovascular: stable and blood pressure at baseline  Hydration: adequate  Anesthetic events: no    QCDR Measures:  ASA# 11 - Elva-op Cardiac Arrest: ASA11B - Patient did NOT experience unanticipated cardiac arrest  ASA# 12 - Elva-op Mortality Rate: ASA12B - Patient did NOT die  ASA# 13 - PACU Re-Intubation Rate: ASA13B - Patient did NOT require a new airway mgmt  ASA# 10 - Composite Anes Safety: ASA10A - No serious adverse event    Additional Notes:

## 2021-06-22 NOTE — ANESTHESIA PREPROCEDURE EVALUATION
Anesthesia Evaluation      Patient summary reviewed   No history of anesthetic complications     Airway   Mallampati: III  Neck ROM: limited   Pulmonary - normal exam   (+) COPD, a smoker                         Cardiovascular - normal exam  Exercise tolerance: > or = 4 METS  (+) hypertension, CAD, , hypercholesterolemia, PVD       ROS comment: s/p CEA bilateral; anemia NOS     Neuro/Psych      Comments: Spinal stenosis, congenital fusion of vertebrae NOS.    Endo/Other    (+) diabetes mellitus type 2 well controlled, arthritis, obesity,      GI/Hepatic/Renal    (+)   chronic renal disease CRI,           Dental - normal exam                        Anesthesia Plan  Planned anesthetic: general LMA and peripheral nerve block  Sciatic and saphenous nerve blocks for POP per surgeon request.  Decadron, Zofran.  Diprivan infusion.  Ketamine (0.5 mg/kg).  GA with LMA or awake FOBI PRN.  ASA 3   Induction: intravenous   Anesthetic plan and risks discussed with: patient and spouse  Anesthesia plan special considerations: increased risk of difficult airway, antiemetics,   Post-op plan: routine recovery